# Patient Record
Sex: MALE | Race: BLACK OR AFRICAN AMERICAN | NOT HISPANIC OR LATINO | Employment: OTHER | URBAN - METROPOLITAN AREA
[De-identification: names, ages, dates, MRNs, and addresses within clinical notes are randomized per-mention and may not be internally consistent; named-entity substitution may affect disease eponyms.]

---

## 2024-10-01 ENCOUNTER — LAB VISIT (OUTPATIENT)
Dept: LAB | Facility: CLINIC | Age: 69
End: 2024-10-01
Payer: COMMERCIAL

## 2024-10-01 ENCOUNTER — OFFICE VISIT (OUTPATIENT)
Dept: FAMILY MEDICINE | Facility: CLINIC | Age: 69
End: 2024-10-01
Payer: COMMERCIAL

## 2024-10-01 VITALS
HEART RATE: 54 BPM | DIASTOLIC BLOOD PRESSURE: 78 MMHG | WEIGHT: 241.31 LBS | OXYGEN SATURATION: 99 % | SYSTOLIC BLOOD PRESSURE: 136 MMHG | BODY MASS INDEX: 32.69 KG/M2 | HEIGHT: 72 IN

## 2024-10-01 DIAGNOSIS — M10.00 IDIOPATHIC GOUT, UNSPECIFIED CHRONICITY, UNSPECIFIED SITE: ICD-10-CM

## 2024-10-01 DIAGNOSIS — Z23 NEED FOR PROPHYLACTIC VACCINATION AGAINST STREPTOCOCCUS PNEUMONIAE (PNEUMOCOCCUS) AND INFLUENZA: ICD-10-CM

## 2024-10-01 DIAGNOSIS — Z29.9 PREVENTIVE MEASURE: ICD-10-CM

## 2024-10-01 DIAGNOSIS — I10 ESSENTIAL HYPERTENSION, BENIGN: Primary | ICD-10-CM

## 2024-10-01 DIAGNOSIS — I10 ESSENTIAL HYPERTENSION, BENIGN: ICD-10-CM

## 2024-10-01 DIAGNOSIS — Z12.5 SPECIAL SCREENING FOR MALIGNANT NEOPLASM OF PROSTATE: ICD-10-CM

## 2024-10-01 DIAGNOSIS — Z13.220 ENCOUNTER FOR SCREENING FOR LIPID DISORDER: ICD-10-CM

## 2024-10-01 DIAGNOSIS — Z11.3 SCREEN FOR STD (SEXUALLY TRANSMITTED DISEASE): ICD-10-CM

## 2024-10-01 DIAGNOSIS — R73.9 ELEVATED BLOOD SUGAR: ICD-10-CM

## 2024-10-01 DIAGNOSIS — Z11.59 ENCOUNTER FOR HEPATITIS C SCREENING TEST FOR LOW RISK PATIENT: ICD-10-CM

## 2024-10-01 LAB
ALBUMIN SERPL BCP-MCNC: 3.9 G/DL (ref 3.5–5.2)
ALBUMIN/CREAT UR: 5.5 UG/MG (ref 0–30)
ALP SERPL-CCNC: 98 U/L (ref 55–135)
ALT SERPL W/O P-5'-P-CCNC: 35 U/L (ref 10–44)
ANION GAP SERPL CALC-SCNC: 10 MMOL/L (ref 8–16)
AST SERPL-CCNC: 30 U/L (ref 10–40)
BILIRUB SERPL-MCNC: 0.4 MG/DL (ref 0.1–1)
BUN SERPL-MCNC: 14 MG/DL (ref 8–23)
CALCIUM SERPL-MCNC: 9.5 MG/DL (ref 8.7–10.5)
CHLORIDE SERPL-SCNC: 106 MMOL/L (ref 95–110)
CHOLEST SERPL-MCNC: 226 MG/DL (ref 120–199)
CHOLEST/HDLC SERPL: 5.5 {RATIO} (ref 2–5)
CO2 SERPL-SCNC: 23 MMOL/L (ref 23–29)
COMPLEXED PSA SERPL-MCNC: 2.8 NG/ML (ref 0–4)
CREAT SERPL-MCNC: 1.6 MG/DL (ref 0.5–1.4)
CREAT UR-MCNC: 183 MG/DL (ref 23–375)
EST. GFR  (NO RACE VARIABLE): 46.4 ML/MIN/1.73 M^2
ESTIMATED AVG GLUCOSE: 126 MG/DL (ref 68–131)
GLUCOSE SERPL-MCNC: 108 MG/DL (ref 70–110)
HBA1C MFR BLD: 6 % (ref 4–5.6)
HCV AB SERPL QL IA: NORMAL
HDLC SERPL-MCNC: 41 MG/DL (ref 40–75)
HDLC SERPL: 18.1 % (ref 20–50)
HIV 1+2 AB+HIV1 P24 AG SERPL QL IA: NORMAL
LDLC SERPL CALC-MCNC: 160.8 MG/DL (ref 63–159)
MICROALBUMIN UR DL<=1MG/L-MCNC: 10 UG/ML
NONHDLC SERPL-MCNC: 185 MG/DL
POTASSIUM SERPL-SCNC: 4.7 MMOL/L (ref 3.5–5.1)
PROT SERPL-MCNC: 7.5 G/DL (ref 6–8.4)
SODIUM SERPL-SCNC: 139 MMOL/L (ref 136–145)
TRIGL SERPL-MCNC: 121 MG/DL (ref 30–150)
URATE SERPL-MCNC: 8.6 MG/DL (ref 3.4–7)

## 2024-10-01 PROCEDURE — 1160F RVW MEDS BY RX/DR IN RCRD: CPT | Mod: S$GLB,,, | Performed by: INTERNAL MEDICINE

## 2024-10-01 PROCEDURE — 99387 INIT PM E/M NEW PAT 65+ YRS: CPT | Mod: 25,S$GLB,, | Performed by: INTERNAL MEDICINE

## 2024-10-01 PROCEDURE — 80053 COMPREHEN METABOLIC PANEL: CPT | Performed by: INTERNAL MEDICINE

## 2024-10-01 PROCEDURE — 86803 HEPATITIS C AB TEST: CPT | Performed by: INTERNAL MEDICINE

## 2024-10-01 PROCEDURE — 3288F FALL RISK ASSESSMENT DOCD: CPT | Mod: S$GLB,,, | Performed by: INTERNAL MEDICINE

## 2024-10-01 PROCEDURE — 90471 IMMUNIZATION ADMIN: CPT | Mod: S$GLB,,, | Performed by: INTERNAL MEDICINE

## 2024-10-01 PROCEDURE — 3075F SYST BP GE 130 - 139MM HG: CPT | Mod: S$GLB,,, | Performed by: INTERNAL MEDICINE

## 2024-10-01 PROCEDURE — 83036 HEMOGLOBIN GLYCOSYLATED A1C: CPT | Performed by: INTERNAL MEDICINE

## 2024-10-01 PROCEDURE — 36415 COLL VENOUS BLD VENIPUNCTURE: CPT | Mod: ,,, | Performed by: INTERNAL MEDICINE

## 2024-10-01 PROCEDURE — 1101F PT FALLS ASSESS-DOCD LE1/YR: CPT | Mod: S$GLB,,, | Performed by: INTERNAL MEDICINE

## 2024-10-01 PROCEDURE — 3078F DIAST BP <80 MM HG: CPT | Mod: S$GLB,,, | Performed by: INTERNAL MEDICINE

## 2024-10-01 PROCEDURE — 82570 ASSAY OF URINE CREATININE: CPT | Performed by: INTERNAL MEDICINE

## 2024-10-01 PROCEDURE — 87389 HIV-1 AG W/HIV-1&-2 AB AG IA: CPT | Performed by: INTERNAL MEDICINE

## 2024-10-01 PROCEDURE — 80061 LIPID PANEL: CPT | Performed by: INTERNAL MEDICINE

## 2024-10-01 PROCEDURE — 84153 ASSAY OF PSA TOTAL: CPT | Performed by: INTERNAL MEDICINE

## 2024-10-01 PROCEDURE — 1159F MED LIST DOCD IN RCRD: CPT | Mod: S$GLB,,, | Performed by: INTERNAL MEDICINE

## 2024-10-01 PROCEDURE — 1126F AMNT PAIN NOTED NONE PRSNT: CPT | Mod: S$GLB,,, | Performed by: INTERNAL MEDICINE

## 2024-10-01 PROCEDURE — 3008F BODY MASS INDEX DOCD: CPT | Mod: S$GLB,,, | Performed by: INTERNAL MEDICINE

## 2024-10-01 PROCEDURE — 84550 ASSAY OF BLOOD/URIC ACID: CPT | Performed by: INTERNAL MEDICINE

## 2024-10-01 PROCEDURE — 90677 PCV20 VACCINE IM: CPT | Mod: S$GLB,,, | Performed by: INTERNAL MEDICINE

## 2024-10-01 RX ORDER — DICLOFENAC SODIUM 50 MG/1
50 TABLET, DELAYED RELEASE ORAL
COMMUNITY
Start: 2024-09-15

## 2024-10-01 RX ORDER — COLCHICINE 0.6 MG/1
0.6 TABLET ORAL
COMMUNITY
Start: 2024-09-15

## 2024-10-01 RX ORDER — ACETAMINOPHEN 500 MG
TABLET ORAL DAILY
COMMUNITY

## 2024-10-01 RX ORDER — CHOLECALCIFEROL (VITAMIN D3) 25 MCG
1000 TABLET ORAL DAILY
COMMUNITY
End: 2024-10-01 | Stop reason: DRUGHIGH

## 2024-10-01 RX ORDER — COLCHICINE 0.6 MG/1
0.6 TABLET ORAL 2 TIMES DAILY
Qty: 60 TABLET | Refills: 0 | Status: SHIPPED | OUTPATIENT
Start: 2024-10-01 | End: 2024-10-31

## 2024-10-01 RX ORDER — NIFEDIPINE 60 MG/1
30 TABLET, EXTENDED RELEASE ORAL DAILY
COMMUNITY

## 2024-10-01 NOTE — ASSESSMENT & PLAN NOTE
I am going to screen him for lipids blood sugar thyroid disease and check his renal function  To consider switching his calcium channel blocker to an Ace or an Arb

## 2024-10-01 NOTE — PROGRESS NOTES
Subjective:       Patient ID: Ghulam Rodriguez is a 69 y.o. male.    Chief Complaint: Establish Care (Patient is here to establish care. )      Mr Rodriguez is a new patient who presents today to establish care and for management of the chronic problems , refills and preventive medicine      Hypertension  This is a chronic problem. The current episode started more than 1 year ago. The problem has been waxing and waning since onset. The problem is controlled. There are no associated agents to hypertension. Risk factors for coronary artery disease include obesity, stress, sedentary lifestyle, family history and dyslipidemia. Past treatments include calcium channel blockers. The current treatment provides moderate improvement. Compliance problems include diet and exercise.      Review of Systems   Constitutional:  Negative for activity change, appetite change and fever.   Respiratory: Negative.     Cardiovascular: Negative.    Gastrointestinal: Negative.    Musculoskeletal: Negative.  Positive for leg pain.         Objective:      Physical Exam  Vitals and nursing note reviewed.   Constitutional:       Appearance: Normal appearance. He is obese.   Cardiovascular:      Rate and Rhythm: Normal rate and regular rhythm.      Pulses: Normal pulses.      Heart sounds: Normal heart sounds. No murmur heard.     No friction rub. No gallop.   Pulmonary:      Effort: Pulmonary effort is normal.      Breath sounds: Normal breath sounds. No wheezing.   Skin:     General: Skin is warm and dry.   Neurological:      Mental Status: He is alert.   Psychiatric:         Mood and Affect: Mood normal.         Assessment:       1. Essential hypertension, benign  Overview:  His blood pressure is controlled today  He was started on nifedipine    Assessment & Plan:  I am going to screen him for lipids blood sugar thyroid disease and check his renal function  To consider switching his calcium channel blocker to an Ace or an Arb    Orders:  -      Comprehensive Metabolic Panel; Future; Expected date: 10/01/2024  -     Microalbumin/Creatinine Ratio, Urine; Future; Expected date: 10/01/2024    2. Elevated blood sugar  -     Hemoglobin A1C; Future; Expected date: 10/01/2024    3. Special screening for malignant neoplasm of prostate  -     Prostate Specific Antigen, Diagnostic; Future; Expected date: 10/01/2024    4. Encounter for screening for lipid disorder  -     Lipid Panel; Future; Expected date: 10/01/2024    5. Screen for STD (sexually transmitted disease)  -     HIV 1/2 Ag/Ab (4th Gen); Future; Expected date: 04/01/2025    6. Encounter for hepatitis C screening test for low risk patient  -     Cancel: Hepatitis C Antibody; Future; Expected date: 10/01/2024  -     Hepatitis C Antibody; Future; Expected date: 10/01/2024    7. Idiopathic gout, unspecified chronicity, unspecified site  Overview:  With occasional pain in his right great toe    Assessment & Plan:  I am going to refill his colchicine  We discussed diet modification  Check his uric acid  To consider allopurinol    Orders:  -     Uric Acid; Future; Expected date: 10/01/2024    8. Need for prophylactic vaccination against Streptococcus pneumoniae (pneumococcus) and influenza  -     pneumoc 20-augie conj-dip cr(PF) (PREVNAR-20 (PF)) injection Syrg 0.5 mL    9. Preventive measure  Overview:  Patient presents for a wellness visit  No new c/o today  Please refer to initial intake notes for screening/preventive measures  All histories and immunization schedule reviewed with patient      Assessment & Plan:  Prevnar 20 today  He declined flu  Get new COVID vaccine  Get PSA  Screen for hep C  Get lipids, A1c  Diet , wt loss , exercise d/w patient        Other orders  -     colchicine (COLCRYS) 0.6 mg tablet; Take 1 tablet (0.6 mg total) by mouth 2 (two) times daily.  Dispense: 60 tablet; Refill: 0           Plan:       1. Essential hypertension, benign  Overview:  His blood pressure is controlled today  He  was started on nifedipine    Assessment & Plan:  I am going to screen him for lipids blood sugar thyroid disease and check his renal function  To consider switching his calcium channel blocker to an Ace or an Arb    Orders:  -     Comprehensive Metabolic Panel; Future; Expected date: 10/01/2024  -     Microalbumin/Creatinine Ratio, Urine; Future; Expected date: 10/01/2024    2. Elevated blood sugar  -     Hemoglobin A1C; Future; Expected date: 10/01/2024    3. Special screening for malignant neoplasm of prostate  -     Prostate Specific Antigen, Diagnostic; Future; Expected date: 10/01/2024    4. Encounter for screening for lipid disorder  -     Lipid Panel; Future; Expected date: 10/01/2024    5. Screen for STD (sexually transmitted disease)  -     HIV 1/2 Ag/Ab (4th Gen); Future; Expected date: 04/01/2025    6. Encounter for hepatitis C screening test for low risk patient  -     Cancel: Hepatitis C Antibody; Future; Expected date: 10/01/2024  -     Hepatitis C Antibody; Future; Expected date: 10/01/2024    7. Idiopathic gout, unspecified chronicity, unspecified site  Overview:  With occasional pain in his right great toe    Assessment & Plan:  I am going to refill his colchicine  We discussed diet modification  Check his uric acid  To consider allopurinol    Orders:  -     Uric Acid; Future; Expected date: 10/01/2024    8. Need for prophylactic vaccination against Streptococcus pneumoniae (pneumococcus) and influenza  -     pneumoc 20-augie conj-dip cr(PF) (PREVNAR-20 (PF)) injection Syrg 0.5 mL    9. Preventive measure  Overview:  Patient presents for a wellness visit  No new c/o today  Please refer to initial intake notes for screening/preventive measures  All histories and immunization schedule reviewed with patient      Assessment & Plan:  Prevnar 20 today  He declined flu  Get new COVID vaccine  Get PSA  Screen for hep C  Get lipids, A1c  Diet , wt loss , exercise d/w patient        Other orders  -      colchicine (COLCRYS) 0.6 mg tablet; Take 1 tablet (0.6 mg total) by mouth 2 (two) times daily.  Dispense: 60 tablet; Refill: 0

## 2024-10-01 NOTE — PROGRESS NOTES
Patient presents for Prevnar 20 vaccination.  Prevnar 20 vaccine given IM per provider order.  See MAR for further details.  Patient tolerated well.  No signs or symptoms of adverse reaction after 15 minutes.

## 2024-10-01 NOTE — ASSESSMENT & PLAN NOTE
Prevnar 20 today  He declined flu  Get new COVID vaccine  Get PSA  Screen for hep C  Get lipids, A1c  Diet , wt loss , exercise d/w patient

## 2024-10-01 NOTE — ASSESSMENT & PLAN NOTE
I am going to refill his colchicine  We discussed diet modification  Check his uric acid  To consider allopurinol

## 2024-10-17 ENCOUNTER — OFFICE VISIT (OUTPATIENT)
Dept: FAMILY MEDICINE | Facility: CLINIC | Age: 69
End: 2024-10-17
Payer: COMMERCIAL

## 2024-10-17 VITALS
SYSTOLIC BLOOD PRESSURE: 122 MMHG | OXYGEN SATURATION: 99 % | DIASTOLIC BLOOD PRESSURE: 68 MMHG | WEIGHT: 240 LBS | BODY MASS INDEX: 32.51 KG/M2 | HEIGHT: 72 IN

## 2024-10-17 DIAGNOSIS — N18.31 TYPE 2 DIABETES MELLITUS WITH STAGE 3A CHRONIC KIDNEY DISEASE, WITHOUT LONG-TERM CURRENT USE OF INSULIN: Primary | ICD-10-CM

## 2024-10-17 DIAGNOSIS — R73.9 ELEVATED BLOOD SUGAR: ICD-10-CM

## 2024-10-17 DIAGNOSIS — Z23 NEED FOR PROPHYLACTIC VACCINATION AGAINST STREPTOCOCCUS PNEUMONIAE (PNEUMOCOCCUS) AND INFLUENZA: ICD-10-CM

## 2024-10-17 DIAGNOSIS — N40.0 BENIGN PROSTATIC HYPERPLASIA WITHOUT LOWER URINARY TRACT SYMPTOMS: ICD-10-CM

## 2024-10-17 DIAGNOSIS — E11.22 TYPE 2 DIABETES MELLITUS WITH STAGE 3A CHRONIC KIDNEY DISEASE, WITHOUT LONG-TERM CURRENT USE OF INSULIN: Primary | ICD-10-CM

## 2024-10-17 DIAGNOSIS — I10 ESSENTIAL HYPERTENSION, BENIGN: ICD-10-CM

## 2024-10-17 DIAGNOSIS — E66.9 OBESITY (BMI 30-39.9): ICD-10-CM

## 2024-10-17 DIAGNOSIS — E78.2 MIXED HYPERLIPIDEMIA: ICD-10-CM

## 2024-10-17 RX ORDER — LISINOPRIL 10 MG/1
10 TABLET ORAL DAILY
COMMUNITY

## 2024-10-17 RX ORDER — ROSUVASTATIN CALCIUM 10 MG/1
10 TABLET, COATED ORAL NIGHTLY
Qty: 90 TABLET | Refills: 1 | Status: SHIPPED | OUTPATIENT
Start: 2024-10-17 | End: 2025-04-15

## 2024-10-17 RX ORDER — SEMAGLUTIDE 0.25 MG/.5ML
0.5 INJECTION, SOLUTION SUBCUTANEOUS WEEKLY
Qty: 4 ML | Refills: 0 | Status: SHIPPED | OUTPATIENT
Start: 2024-10-17 | End: 2024-11-14

## 2024-10-17 NOTE — PROGRESS NOTES
Patient presents for influenza vaccination.  Flu vaccine given IM per provider order.  See MAR for further details.  Patient tolerated well.  No signs or symptoms of adverse reaction after 15 minutes.

## 2024-10-17 NOTE — PATIENT INSTRUCTIONS
Co Q-10 100 or 200mg daily    Please get the following vaccines from local pharmacy:    Flu  RSV  COVID

## 2024-10-17 NOTE — PROGRESS NOTES
"Subjective:       Patient ID: Ghulam Rodriguez is a 69 y.o. male.    Chief Complaint: Results (Patient is here for results of his blood work. )      History of Present Illness    CHIEF COMPLAINT:  Ghulam presents for a follow-up visit to discuss recent lab results and complains of a locking right index finger.    HPI:  Ghulam reports intermittent locking of his right index finger, without providing specific details about the onset, duration, or severity of this symptom. He has been taking colchicine twice daily for gout management and using Voltaren (diclofenac) for pain relief.    Recent lab work revealed borderline high blood sugar, though he has not been diagnosed with diabetes. His cholesterol levels were also high, particularly his LDL at 160. The lab results indicated reduced kidney function, described as "stage 3A" chronic kidney disease.    Ghulam denies any other problems or concerns besides the finger issue and has not been diagnosed with diabetes.    MEDICATIONS:  Ghulam is on Lisinopril and Nifedipine for blood pressure management. He is also taking Colchicine twice daily for gout and Diclofenac (Voltaren) as needed for pain and inflammation.    MEDICAL HISTORY:  Ghulam has a history of gout, chronic kidney disease (Stage 3A), hypercholesterolemia, hypertension, and prediabetes.    TEST RESULTS:  Ghulam's recent blood sugar were borderline elevated. His cholesterol levels were also recently elevated, with an LDL of 160. Kidney function tests showed a reduction to stage 3A. Ghulam's A1C is scheduled to be repeated next year.         Review of Systems   Constitutional:  Negative for activity change, appetite change and fever.   Respiratory: Negative.     Cardiovascular: Negative.    Gastrointestinal: Negative.    Musculoskeletal: Negative.          Objective:      Physical Exam  Vitals and nursing note reviewed.   Constitutional:       Appearance: Normal appearance. He is obese.   Cardiovascular:      Rate " and Rhythm: Normal rate and regular rhythm.      Pulses: Normal pulses.      Heart sounds: Normal heart sounds. No murmur heard.     No friction rub. No gallop.   Pulmonary:      Effort: Pulmonary effort is normal.      Breath sounds: Normal breath sounds. No wheezing.   Abdominal:      General: Abdomen is flat. Bowel sounds are normal.      Palpations: Abdomen is soft.   Musculoskeletal:         General: Normal range of motion.      Comments: Mild triggering of R index finger   Skin:     General: Skin is warm and dry.   Neurological:      General: No focal deficit present.      Mental Status: He is alert and oriented to person, place, and time.   Psychiatric:         Mood and Affect: Mood normal.         Assessment:       1. Type 2 diabetes mellitus with stage 3a chronic kidney disease, without long-term current use of insulin  Assessment & Plan:  Inc BS  No DM diagnosis yet  Monitor BS, A1c    Orders:  -     rosuvastatin (CRESTOR) 10 MG tablet; Take 1 tablet (10 mg total) by mouth every evening.  Dispense: 90 tablet; Refill: 1  -     semaglutide, weight loss, (WEGOVY) 0.25 mg/0.5 mL PnIj; Inject 0.5 mg into the skin once a week.  Dispense: 4 mL; Refill: 0    2. Mixed hyperlipidemia  -     rosuvastatin (CRESTOR) 10 MG tablet; Take 1 tablet (10 mg total) by mouth every evening.  Dispense: 90 tablet; Refill: 1  -     Lipid Panel; Future; Expected date: 10/17/2024    3. Essential hypertension, benign  Overview:  His blood pressure is controlled today  He was started on nifedipine    Orders:  -     Microalbumin/Creatinine Ratio, Urine; Future; Expected date: 10/17/2024  -     Comprehensive Metabolic Panel; Future; Expected date: 10/17/2024    4. Elevated blood sugar  -     Hemoglobin A1C; Future; Expected date: 10/17/2024    5. Benign prostatic hyperplasia without lower urinary tract symptoms  -     Prostate Specific Antigen, Diagnostic; Future; Expected date: 10/17/2024    6. Need for prophylactic vaccination against  Streptococcus pneumoniae (pneumococcus) and influenza  -     influenza (adjuvanted) (Fluad) 45 mcg/0.5 mL IM vaccine (> or = 66 yo) 0.5 mL    7. Obesity (BMI 30-39.9)  -     semaglutide, weight loss, (WEGOVY) 0.25 mg/0.5 mL PnIj; Inject 0.5 mg into the skin once a week.  Dispense: 4 mL; Refill: 0           Plan:       Assessment & Plan    Assessed lab results: borderline elevated blood sugar, elevated LDL cholesterol (160), and stage 3A chronic kidney disease  Evaluated patient's 10-year risk of heart attack using risk calculator, determining elevated risk with and without diabetes diagnosis  Diagnosed right trigger finger based on patient's symptoms and exam  Considered weight loss medication (Wegovy) for patient, pending insurance coverage    CHRONIC KIDNEY DISEASE:  - Discussed chronic kidney disease.  - Discontinued colchicine for gout due to potential kidney damage.  - Decreased diclofenac (Voltaren) use due to potential kidney damage.  - Recommend using Tylenol instead of anti-inflammatory medications for pain management.    HYPERLIPIDEMIA:  - Explained importance of a low-fat diet for managing cholesterol and blood sugar.  - Reviewed benefits of CoQ10 supplement for heart health and potential muscle aches from cholesterol medication.  - Hgulam to follow a low-fat diet.  - Started Rosuvastatin (Crestor) for cholesterol management, to be taken nightly at bedtime.  - Prescribed 90 pills with 1 refill.  - Started CoQ10 100mg or 200mg daily (OTC).    DIABETES MANAGEMENT:  - Recommend reducing carbohydrate intake.  - Ghulam to avoid sugary drinks and soy.  - Recommend reducing bread consumption.  - Ghulam to choose white meat over red meat.    HYPERTENSION:  - Continued lisinopril and nifedipine for blood pressure management.    WEIGHT MANAGEMENT:  - Started Wegovy 0.5mg weekly for weight loss, pending insurance coverage.  - Recommend increasing physical activity, particularly walking.    HYDRATION:  - Ghulam to  increase water intake.    FLU VACCINATION:  - Administered flu vaccine during visit.    LABS:  - Ordered labs for January, including kidney function tests, cholesterol panel, and HbA1c.  - Complete labs 2 days before next appointment.    FOLLOW UP:  - Follow up in 3 months (January).       Ghulam was seen today for results.    Diagnoses and all orders for this visit:    Type 2 diabetes mellitus with stage 3a chronic kidney disease, without long-term current use of insulin  -     rosuvastatin (CRESTOR) 10 MG tablet; Take 1 tablet (10 mg total) by mouth every evening.  -     semaglutide, weight loss, (WEGOVY) 0.25 mg/0.5 mL PnIj; Inject 0.5 mg into the skin once a week.    Mixed hyperlipidemia  -     rosuvastatin (CRESTOR) 10 MG tablet; Take 1 tablet (10 mg total) by mouth every evening.  -     Lipid Panel; Future    Essential hypertension, benign  -     Microalbumin/Creatinine Ratio, Urine; Future  -     Comprehensive Metabolic Panel; Future    Elevated blood sugar  -     Hemoglobin A1C; Future    Benign prostatic hyperplasia without lower urinary tract symptoms  -     Prostate Specific Antigen, Diagnostic; Future    Need for prophylactic vaccination against Streptococcus pneumoniae (pneumococcus) and influenza  -     influenza (adjuvanted) (Fluad) 45 mcg/0.5 mL IM vaccine (> or = 64 yo) 0.5 mL    Obesity (BMI 30-39.9)  -     semaglutide, weight loss, (WEGOVY) 0.25 mg/0.5 mL PnIj; Inject 0.5 mg into the skin once a week.            Visit today included increased complexity associated with the care of the episodic problem.   I addressed and managing the longitudinal care of the patient due to the serious and/or complex managed problem(s).  .

## 2025-01-13 ENCOUNTER — LAB VISIT (OUTPATIENT)
Dept: LAB | Facility: CLINIC | Age: 70
End: 2025-01-13
Payer: COMMERCIAL

## 2025-01-13 DIAGNOSIS — E78.2 MIXED HYPERLIPIDEMIA: ICD-10-CM

## 2025-01-13 DIAGNOSIS — I10 ESSENTIAL HYPERTENSION, BENIGN: ICD-10-CM

## 2025-01-13 DIAGNOSIS — R73.9 ELEVATED BLOOD SUGAR: ICD-10-CM

## 2025-01-13 LAB
ALBUMIN SERPL BCP-MCNC: 4 G/DL (ref 3.5–5.2)
ALBUMIN/CREAT UR: 6.3 UG/MG (ref 0–30)
ALP SERPL-CCNC: 89 U/L (ref 40–150)
ALT SERPL W/O P-5'-P-CCNC: 31 U/L (ref 10–44)
ANION GAP SERPL CALC-SCNC: 9 MMOL/L (ref 8–16)
AST SERPL-CCNC: 27 U/L (ref 10–40)
BILIRUB SERPL-MCNC: 0.4 MG/DL (ref 0.1–1)
BUN SERPL-MCNC: 14 MG/DL (ref 8–23)
CALCIUM SERPL-MCNC: 9.7 MG/DL (ref 8.7–10.5)
CHLORIDE SERPL-SCNC: 107 MMOL/L (ref 95–110)
CHOLEST SERPL-MCNC: 155 MG/DL (ref 120–199)
CHOLEST/HDLC SERPL: 3.9 {RATIO} (ref 2–5)
CO2 SERPL-SCNC: 26 MMOL/L (ref 23–29)
CREAT SERPL-MCNC: 1.6 MG/DL (ref 0.5–1.4)
CREAT UR-MCNC: 142 MG/DL (ref 23–375)
EST. GFR  (NO RACE VARIABLE): 46.4 ML/MIN/1.73 M^2
ESTIMATED AVG GLUCOSE: 128 MG/DL (ref 68–131)
GLUCOSE SERPL-MCNC: 108 MG/DL (ref 70–110)
HBA1C MFR BLD: 6.1 % (ref 4–5.6)
HDLC SERPL-MCNC: 40 MG/DL (ref 40–75)
HDLC SERPL: 25.8 % (ref 20–50)
LDLC SERPL CALC-MCNC: 99.6 MG/DL (ref 63–159)
MICROALBUMIN UR DL<=1MG/L-MCNC: 9 UG/ML
NONHDLC SERPL-MCNC: 115 MG/DL
POTASSIUM SERPL-SCNC: 4.8 MMOL/L (ref 3.5–5.1)
PROT SERPL-MCNC: 7.4 G/DL (ref 6–8.4)
SODIUM SERPL-SCNC: 142 MMOL/L (ref 136–145)
TRIGL SERPL-MCNC: 77 MG/DL (ref 30–150)

## 2025-01-13 PROCEDURE — 83036 HEMOGLOBIN GLYCOSYLATED A1C: CPT | Performed by: INTERNAL MEDICINE

## 2025-01-13 PROCEDURE — 82043 UR ALBUMIN QUANTITATIVE: CPT | Performed by: INTERNAL MEDICINE

## 2025-01-13 PROCEDURE — 80061 LIPID PANEL: CPT | Performed by: INTERNAL MEDICINE

## 2025-01-13 PROCEDURE — 80053 COMPREHEN METABOLIC PANEL: CPT | Performed by: INTERNAL MEDICINE

## 2025-01-13 PROCEDURE — 36415 COLL VENOUS BLD VENIPUNCTURE: CPT | Mod: ,,, | Performed by: INTERNAL MEDICINE

## 2025-01-17 ENCOUNTER — OFFICE VISIT (OUTPATIENT)
Dept: FAMILY MEDICINE | Facility: CLINIC | Age: 70
End: 2025-01-17
Payer: COMMERCIAL

## 2025-01-17 VITALS
BODY MASS INDEX: 31.83 KG/M2 | HEART RATE: 62 BPM | DIASTOLIC BLOOD PRESSURE: 79 MMHG | HEIGHT: 72 IN | WEIGHT: 235 LBS | OXYGEN SATURATION: 98 % | SYSTOLIC BLOOD PRESSURE: 135 MMHG

## 2025-01-17 DIAGNOSIS — M1A.1790 LEAD-INDUCED CHRONIC GOUT OF FOOT WITHOUT TOPHUS, UNSPECIFIED LATERALITY, SUBSEQUENT ENCOUNTER: ICD-10-CM

## 2025-01-17 DIAGNOSIS — I10 ESSENTIAL HYPERTENSION, BENIGN: ICD-10-CM

## 2025-01-17 DIAGNOSIS — T56.0X1D LEAD-INDUCED CHRONIC GOUT OF FOOT WITHOUT TOPHUS, UNSPECIFIED LATERALITY, SUBSEQUENT ENCOUNTER: ICD-10-CM

## 2025-01-17 DIAGNOSIS — Z00.00 WELLNESS EXAMINATION: ICD-10-CM

## 2025-01-17 DIAGNOSIS — N18.31 CHRONIC KIDNEY DISEASE (CKD) STAGE G3A/A1, MODERATELY DECREASED GLOMERULAR FILTRATION RATE (GFR) BETWEEN 45-59 ML/MIN/1.73 SQUARE METER AND ALBUMINURIA CREATININE RATIO LESS THAN 30 MG/G: Primary | ICD-10-CM

## 2025-01-17 DIAGNOSIS — M1A.0720 IDIOPATHIC CHRONIC GOUT OF LEFT FOOT WITHOUT TOPHUS: ICD-10-CM

## 2025-01-17 PROBLEM — E11.22 TYPE 2 DIABETES MELLITUS WITH STAGE 3A CHRONIC KIDNEY DISEASE, WITHOUT LONG-TERM CURRENT USE OF INSULIN: Status: RESOLVED | Noted: 2024-10-17 | Resolved: 2025-01-17

## 2025-01-17 PROBLEM — M1A.0790 CHRONIC GOUT OF FOOT: Status: ACTIVE | Noted: 2025-01-17

## 2025-01-17 PROCEDURE — 1101F PT FALLS ASSESS-DOCD LE1/YR: CPT | Mod: S$GLB,,, | Performed by: INTERNAL MEDICINE

## 2025-01-17 PROCEDURE — 99397 PER PM REEVAL EST PAT 65+ YR: CPT | Mod: S$GLB,,, | Performed by: INTERNAL MEDICINE

## 2025-01-17 PROCEDURE — 1160F RVW MEDS BY RX/DR IN RCRD: CPT | Mod: S$GLB,,, | Performed by: INTERNAL MEDICINE

## 2025-01-17 PROCEDURE — 3061F NEG MICROALBUMINURIA REV: CPT | Mod: S$GLB,,, | Performed by: INTERNAL MEDICINE

## 2025-01-17 PROCEDURE — 3288F FALL RISK ASSESSMENT DOCD: CPT | Mod: S$GLB,,, | Performed by: INTERNAL MEDICINE

## 2025-01-17 PROCEDURE — 3044F HG A1C LEVEL LT 7.0%: CPT | Mod: S$GLB,,, | Performed by: INTERNAL MEDICINE

## 2025-01-17 PROCEDURE — 99213 OFFICE O/P EST LOW 20 MIN: CPT | Mod: 25,S$GLB,, | Performed by: INTERNAL MEDICINE

## 2025-01-17 PROCEDURE — 1159F MED LIST DOCD IN RCRD: CPT | Mod: S$GLB,,, | Performed by: INTERNAL MEDICINE

## 2025-01-17 PROCEDURE — 3075F SYST BP GE 130 - 139MM HG: CPT | Mod: S$GLB,,, | Performed by: INTERNAL MEDICINE

## 2025-01-17 PROCEDURE — 1126F AMNT PAIN NOTED NONE PRSNT: CPT | Mod: S$GLB,,, | Performed by: INTERNAL MEDICINE

## 2025-01-17 PROCEDURE — 3078F DIAST BP <80 MM HG: CPT | Mod: S$GLB,,, | Performed by: INTERNAL MEDICINE

## 2025-01-17 PROCEDURE — 4010F ACE/ARB THERAPY RXD/TAKEN: CPT | Mod: S$GLB,,, | Performed by: INTERNAL MEDICINE

## 2025-01-17 PROCEDURE — 3066F NEPHROPATHY DOC TX: CPT | Mod: S$GLB,,, | Performed by: INTERNAL MEDICINE

## 2025-01-17 PROCEDURE — 3008F BODY MASS INDEX DOCD: CPT | Mod: S$GLB,,, | Performed by: INTERNAL MEDICINE

## 2025-01-17 RX ORDER — ALLOPURINOL 100 MG/1
100 TABLET ORAL DAILY
Qty: 90 TABLET | Refills: 1 | Status: SHIPPED | OUTPATIENT
Start: 2025-01-17 | End: 2025-07-16

## 2025-01-17 RX ORDER — LISINOPRIL 20 MG/1
20 TABLET ORAL DAILY
COMMUNITY
End: 2025-01-17 | Stop reason: SDUPTHER

## 2025-01-17 RX ORDER — COLCHICINE 0.6 MG/1
0.6 TABLET ORAL DAILY
Qty: 30 TABLET | Refills: 2 | Status: SHIPPED | OUTPATIENT
Start: 2025-01-17 | End: 2025-04-17

## 2025-01-17 RX ORDER — ALLOPURINOL 100 MG/1
100 TABLET ORAL DAILY
COMMUNITY
End: 2025-01-17 | Stop reason: SDUPTHER

## 2025-01-17 RX ORDER — LISINOPRIL 20 MG/1
20 TABLET ORAL DAILY
Qty: 90 TABLET | Refills: 1 | Status: SHIPPED | OUTPATIENT
Start: 2025-01-17 | End: 2025-07-16

## 2025-01-18 NOTE — PROGRESS NOTES
Subjective:       Patient ID: Ghulam Rodriguez is a 69 y.o. male.    Chief Complaint: Health Maintenance (Patient is here for health [facility].) and Annual Exam      History of Present Illness    CHIEF COMPLAINT:  Ghulam presents for a follow-up visit to discuss recent lab results, particularly focusing on kidney function and gout management.    HPI:  Ghulam underwent labs on Monday to assess kidney function and other health parameters. He reports persistent gout symptoms affecting his left great toe. There has been confusion regarding medication usage for gout management, as he has been taking the as-needed medication twice daily instead of only during gout flares, which may have contributed to his kidney issues. He reports attempting dietary modifications to improve his health, including reducing whyte consumption and expressing concern about pork intake. Ghulam is diagnosed with stage 3A renal insufficiency and denies any new or worsening symptoms.    MEDICATIONS:  Ghulam is on Lisinopril 20 mg daily for blood pressure management. He is also taking Allopurinol daily for gout prevention and Colchicine as needed for gout flares.    MEDICAL HISTORY:  Ghulam has a history of gout, stage 3A renal insufficiency, and hypertension.    TEST RESULTS:  A chemistry panel was conducted on Monday. The results showed stage 3A renal insufficiency, while glucose was within normal limits. Other results were within the normal range, except for the kidney-related values.    SOCIAL HISTORY:  Ghulam is retired but currently works as a . He is .         Review of Systems   Constitutional:  Negative for activity change, appetite change and fever.   Respiratory: Negative.     Cardiovascular: Negative.    Gastrointestinal: Negative.    Musculoskeletal: Negative.          Objective:      Physical Exam  Vitals and nursing note reviewed.   Constitutional:       Appearance: Normal appearance. He is obese.    Cardiovascular:      Rate and Rhythm: Normal rate and regular rhythm.      Pulses: Normal pulses.      Heart sounds: Normal heart sounds. No murmur heard.     No friction rub. No gallop.   Pulmonary:      Effort: Pulmonary effort is normal.      Breath sounds: Normal breath sounds. No wheezing.   Abdominal:      General: Abdomen is flat. Bowel sounds are normal.      Palpations: Abdomen is soft.   Musculoskeletal:         General: Normal range of motion.   Skin:     General: Skin is warm and dry.   Neurological:      General: No focal deficit present.      Mental Status: He is alert and oriented to person, place, and time.   Psychiatric:         Mood and Affect: Mood normal.         Assessment:       1. Chronic kidney disease (CKD) stage G3a/A1, moderately decreased glomerular filtration rate (GFR) between 45-59 mL/min/1.73 square meter and albuminuria creatinine ratio less than 30 mg/g  Overview:  Table, low GFR    Assessment & Plan:  I discussed renal condition with patient at length  We discussed diet modification specifically decreasing salt intake, avoiding fast food ,avoiding fried food  We also discussed the importance of minimizing the use of anti-inflammatory agents like Motrin ibuprofen naproxen Aleve etc.  We discussed the importance of increasing fluid intake specifically water and also stay hydrated  We discussed minimizing the use of diuretics as much as clinically possible  We are going to monitor renal functions BUN creatinine urine microalbumin and electrolytes closely  To consider referral to Nephrology Service if renal fx continue to decline  To consider adding an SGL- 2 inhibitor if renal functions continue to decline, regardless of the blood sugar status      Orders:  -     Basic Metabolic Panel; Future; Expected date: 01/17/2025    2. Idiopathic chronic gout of left foot without tophus  Overview:  With occasional pain in his right great toe    Orders:  -     Uric acid; Future; Expected date:  01/17/2025    3. Wellness examination  Overview:  Patient presents for a wellness visit  No new c/o today  Please refer to initial intake notes for screening/preventive measures  All histories and immunization schedule reviewed with patient      Assessment & Plan:  I gave the patient written recommendations re the outstanding vaccinations.  Diet , wt loss , exercise d/w patient          4. Essential hypertension, benign  Overview:  His blood pressure is controlled today  He was started on nifedipine    Assessment & Plan:  Make lisinopril 20mg  Diet , wt loss , exercise d/w patient        5. 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  Dispense: 30 tablet; Refill: 2  -     allopurinoL (ZYLOPRIM) 100 MG tablet; Take 1 tablet (100 mg total) by mouth once daily.  Dispense: 90 tablet; Refill: 1  -     lisinopriL (PRINIVIL,ZESTRIL) 20 MG tablet; Take 1 tablet (20 mg total) by mouth once daily.  Dispense: 90 tablet; Refill: 1           Plan:       Assessment & Plan    IMPRESSION:  - Assessed renal function, noting stage 3A renal insufficiency with no improvement but no worsening  - Evaluated gout management, identifying need for medication adjustment to improve kidney function  - Reviewed blood pressure control, deciding to increase lisinopril dosage    TYPE 2 DIABETES MELLITUS WITH DIABETIC CHRONIC KIDNEY DISEASE:  - Monitored blood sugar, which are currently good.  - Evaluated kidneys as stage 3A renal insufficiency, with no change.  - Discussed kidney health and will attach information to the visit summary.  - Advised patient on lifestyle modifications: drink plenty of fluids, reduce salt intake, exercise, lose weight, eat healthy, and avoid  excessive use of NSAIDs.    GOUT:  - Monitored ongoing issues with gout in the left great toe and evaluated flare-ups.  - Continued allopurinol daily for gout management, providing a 6-month prescription.  - Prescribed colchicine for gout flares, to be taken only during flares (maximum 2 tablets per day during severe flares).  - Advised against frequent use of colchicine due to potential kidney damage.  - Ordered labs to check gout levels in 3 months.    HYPERTENSION:  - Noted slightly elevated blood pressure and increased lisinopril from 10 mg to 20 mg daily for better control.  - Advised on transitioning to the new dosage.    HYPERLIPIDEMIA:  - Monitored lipid levels through recent labs, noting that most values are good.  - No specific treatment for hyperlipidemia was mentioned.    WEIGHT MANAGEMENT:  - Recommend losing weight through a healthy diet, including continued reduction of whyte and pork consumption.    OTHER INSTRUCTIONS:  - Reviewed the risks of driving on icy roads.       Ghulam was seen today for health maintenance and annual exam.    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  -     allopurinoL (ZYLOPRIM) 100 MG tablet; Take 1 tablet (100 mg total) by mouth once daily.  -     lisinopriL (PRINIVIL,ZESTRIL) 20 MG tablet; Take 1 tablet (20 mg total) by mouth once daily.

## 2025-01-18 NOTE — ASSESSMENT & PLAN NOTE
I gave the patient written recommendations re the outstanding vaccinations.  Diet , wt loss , exercise d/w patient       Received call from [facility] desk regarding pt's upcoming labs. Mom would like a rush on results because they need them by tomorrow. Please advise.

## 2025-04-14 ENCOUNTER — LAB VISIT (OUTPATIENT)
Dept: LAB | Facility: CLINIC | Age: 70
End: 2025-04-14
Payer: MEDICARE

## 2025-04-14 DIAGNOSIS — E11.22 TYPE 2 DIABETES MELLITUS WITH STAGE 3A CHRONIC KIDNEY DISEASE, WITHOUT LONG-TERM CURRENT USE OF INSULIN: ICD-10-CM

## 2025-04-14 DIAGNOSIS — N18.31 TYPE 2 DIABETES MELLITUS WITH STAGE 3A CHRONIC KIDNEY DISEASE, WITHOUT LONG-TERM CURRENT USE OF INSULIN: ICD-10-CM

## 2025-04-14 DIAGNOSIS — M1A.0720 IDIOPATHIC CHRONIC GOUT OF LEFT FOOT WITHOUT TOPHUS: ICD-10-CM

## 2025-04-14 DIAGNOSIS — E78.2 MIXED HYPERLIPIDEMIA: ICD-10-CM

## 2025-04-14 LAB — URATE SERPL-MCNC: 9.4 MG/DL (ref 3.4–7)

## 2025-04-14 PROCEDURE — 84550 ASSAY OF BLOOD/URIC ACID: CPT

## 2025-04-14 RX ORDER — ROSUVASTATIN CALCIUM 10 MG/1
10 TABLET, COATED ORAL NIGHTLY
Qty: 90 TABLET | Refills: 2 | Status: SHIPPED | OUTPATIENT
Start: 2025-04-14

## 2025-04-14 NOTE — TELEPHONE ENCOUNTER
Provider Staff:  Action required for this patient    Requires labs      Please see care gap opportunities below in Care Due Message.    Thanks!  [facility]     Appointments      Date Provider   Last Visit   1/17/2025 Sb Walls MD   Next Visit   4/17/2025 Sb Walls MD     Refill Decision Note   Ghulam Orellanancer  is requesting a refill authorization.  Brief Assessment and Rationale for Refill:  Approve     Medication Therapy Plan:         Comments:     Note composed:8:07 AM 04/14/2025

## 2025-04-14 NOTE — TELEPHONE ENCOUNTER
Care Due:                  Date            Visit Type   Department     Provider  --------------------------------------------------------------------------------                                EP -                              PRIMARY      [facility] FAMILY  Last Visit: 01-      CARE ([facility])   GARRISON Walls                              EP -                              PRIMARY      [facility] FAMILY  Next Visit: 04-      CARE ([facility])   [facility]       Sb Walls                                                            Last  Test          Frequency    Reason                     Performed    Due Date  --------------------------------------------------------------------------------    CBC.........  12 months..  allopurinoL..............  Not Found    Overdue    [employer] Embedded Care Due Messages. Reference number: [id number].   4/14/2025 3:36:31 AM CDT

## 2025-04-17 ENCOUNTER — OFFICE VISIT (OUTPATIENT)
Dept: FAMILY MEDICINE | Facility: CLINIC | Age: 70
End: 2025-04-17
Payer: MEDICARE

## 2025-04-17 ENCOUNTER — TELEPHONE (OUTPATIENT)
Dept: FAMILY MEDICINE | Facility: CLINIC | Age: 70
End: 2025-04-17

## 2025-04-17 VITALS
HEIGHT: 72 IN | SYSTOLIC BLOOD PRESSURE: 138 MMHG | OXYGEN SATURATION: 98 % | BODY MASS INDEX: 32.83 KG/M2 | DIASTOLIC BLOOD PRESSURE: 80 MMHG | WEIGHT: 242.38 LBS

## 2025-04-17 DIAGNOSIS — R73.9 ELEVATED BLOOD SUGAR: ICD-10-CM

## 2025-04-17 DIAGNOSIS — N18.31 CHRONIC KIDNEY DISEASE (CKD) STAGE G3A/A1, MODERATELY DECREASED GLOMERULAR FILTRATION RATE (GFR) BETWEEN 45-59 ML/MIN/1.73 SQUARE METER AND ALBUMINURIA CREATININE RATIO LESS THAN 30 MG/G: ICD-10-CM

## 2025-04-17 DIAGNOSIS — M1A.0720 CHRONIC IDIOPATHIC GOUT INVOLVING TOE OF LEFT FOOT WITHOUT TOPHUS: ICD-10-CM

## 2025-04-17 DIAGNOSIS — I10 ESSENTIAL HYPERTENSION, BENIGN: Primary | ICD-10-CM

## 2025-04-17 DIAGNOSIS — Z00.00 PREVENTATIVE HEALTH CARE: ICD-10-CM

## 2025-04-17 DIAGNOSIS — N18.1 CHRONIC KIDNEY DISEASE, STAGE I: ICD-10-CM

## 2025-04-17 DIAGNOSIS — E78.2 MIXED HYPERLIPIDEMIA: ICD-10-CM

## 2025-04-17 DIAGNOSIS — N40.0 BENIGN PROSTATIC HYPERPLASIA, UNSPECIFIED WHETHER LOWER URINARY TRACT SYMPTOMS PRESENT: ICD-10-CM

## 2025-04-17 NOTE — PROGRESS NOTES
Subjective:       Patient ID: Ghulam Rodriguez is a 69 y.o. male.    Chief Complaint: Health Maintenance (Pt had Labs and came in today for results review)      History of Present Illness              Review of Systems   Constitutional:  Negative for activity change, appetite change and fever.   Respiratory: Negative.     Cardiovascular: Negative.    Gastrointestinal: Negative.    Musculoskeletal: Negative.          Objective:      Physical Exam  Vitals and nursing note reviewed.   Constitutional:       Appearance: Normal appearance. He is obese.   Cardiovascular:      Rate and Rhythm: Normal rate and regular rhythm.      Pulses: Normal pulses.      Heart sounds: Normal heart sounds. No murmur heard.     No friction rub. No gallop.   Pulmonary:      Effort: Pulmonary effort is normal.      Breath sounds: Normal breath sounds. No wheezing.   Skin:     General: Skin is warm and dry.   Neurological:      Mental Status: He is alert.   Psychiatric:         Mood and Affect: Mood normal.         Assessment:       1. Essential hypertension, benign  Overview:  His blood pressure is mildly elevated today  He was started on nifedipine  Now on lisinopril too  Monitor    Orders:  -     Comprehensive Metabolic Panel; Future; Expected date: 04/17/2025  -     Microalbumin/Creatinine Ratio, Urine; Future; Expected date: 04/17/2025    2. Mixed hyperlipidemia  -     Lipid Panel; Future; Expected date: 04/17/2025    3. Benign prostatic hyperplasia, unspecified whether lower urinary tract symptoms present  -     Prostate Specific Antigen, Diagnostic; Future; Expected date: 04/17/2025    4. Elevated blood sugar  -     Hemoglobin A1C; Future; Expected date: 04/17/2025    5. Chronic idiopathic gout involving toe of left foot without tophus  Overview:  Stable  Inc uric acid    Assessment & Plan:  Cont alloprurinol  Diet modif  Monitor      6. Chronic kidney disease, stage I    7. Chronic kidney disease (CKD) stage G3a/A1, moderately decreased  glomerular filtration rate (GFR) between 45-59 mL/min/1.73 square meter and albuminuria creatinine ratio less than 30 mg/g  Overview:  Table, low GFR    Assessment & Plan:  I discussed renal condition with patient at length  We discussed diet modification specifically decreasing salt intake, avoiding fast food ,avoiding fried food  We also discussed the importance of minimizing the use of anti-inflammatory agents like Motrin ibuprofen naproxen Aleve etc.  We discussed the importance of increasing fluid intake specifically water and also stay hydrated  We discussed minimizing the use of diuretics as much as clinically possible  We are going to monitor renal functions BUN creatinine urine microalbumin and electrolytes closely  To consider referral to Nephrology Service if renal fx continue to decline  To consider adding an SGL- 2 inhibitor if renal functions continue to decline, regardless of the blood sugar status        8. Preventative health care  Overview:  See below    Assessment & Plan:  Get RSV , new COVID shots             Plan:       Assessment & Plan            Ghulam was seen today for health maintenance.    Diagnoses and all orders for this visit:    Essential hypertension, benign  -     Comprehensive Metabolic Panel; Future  -     Microalbumin/Creatinine Ratio, Urine; Future    Mixed hyperlipidemia  -     Lipid Panel; Future    Benign prostatic hyperplasia, unspecified whether lower urinary tract symptoms present  -     Prostate Specific Antigen, Diagnostic; Future    Elevated blood sugar  -     Hemoglobin A1C; Future    Chronic idiopathic gout involving toe of left foot without tophus    Chronic kidney disease, stage I    Chronic kidney disease (CKD) stage G3a/A1, moderately decreased glomerular filtration rate (GFR) between 45-59 mL/min/1.73 square meter and albuminuria creatinine ratio less than 30 mg/g    Preventative health care          Visit today included increased complexity associated with the  care of the episodic problem.   I addressed and managing the longitudinal care of the patient due to the serious and/or complex managed problem(s).  .

## 2025-06-23 ENCOUNTER — TELEPHONE (OUTPATIENT)
Dept: FAMILY MEDICINE | Facility: CLINIC | Age: 70
End: 2025-06-23
Payer: MEDICARE

## 2025-06-23 ENCOUNTER — PATIENT MESSAGE (OUTPATIENT)
Dept: FAMILY MEDICINE | Facility: CLINIC | Age: 70
End: 2025-06-23
Payer: MEDICARE

## 2025-06-23 NOTE — TELEPHONE ENCOUNTER
Called to reschedule patient's upcoming appointment, as Dr. Montoya is no longer with our practice. There was no answer, so a voicemail was left requesting a call back to reschedule with another provider.

## 2025-07-09 ENCOUNTER — PATIENT MESSAGE (OUTPATIENT)
Dept: FAMILY MEDICINE | Facility: CLINIC | Age: 70
End: 2025-07-09
Payer: MEDICARE

## 2025-07-17 PROCEDURE — 83036 HEMOGLOBIN GLYCOSYLATED A1C: CPT | Performed by: INTERNAL MEDICINE

## 2025-07-17 PROCEDURE — 82043 UR ALBUMIN QUANTITATIVE: CPT | Performed by: INTERNAL MEDICINE

## 2025-07-17 PROCEDURE — 82465 ASSAY BLD/SERUM CHOLESTEROL: CPT | Performed by: INTERNAL MEDICINE

## 2025-07-17 PROCEDURE — 84460 ALANINE AMINO (ALT) (SGPT): CPT | Performed by: INTERNAL MEDICINE

## 2025-07-18 ENCOUNTER — TELEPHONE (OUTPATIENT)
Dept: FAMILY MEDICINE | Facility: CLINIC | Age: 70
End: 2025-07-18
Payer: MEDICARE

## 2025-07-18 NOTE — TELEPHONE ENCOUNTER
----- Message from Clif Newell MD sent at 7/18/2025  6:48 AM CDT -----  Please contact the patient and let them know that their results show increasing blood sugar and some kidney dysfunction.  Needs office followup.  ----- Message -----  From: Lab, Background User  Sent: 7/17/2025   2:18 PM CDT  To: Results Cecilio Basurto

## 2025-07-18 NOTE — TELEPHONE ENCOUNTER
LVMTCB concerning labs. I also sent portal message to inform pt  that appt has been scheduled to discuss labs.                                                            -

## 2025-07-23 ENCOUNTER — OFFICE VISIT (OUTPATIENT)
Dept: FAMILY MEDICINE | Facility: CLINIC | Age: 70
End: 2025-07-23
Payer: MEDICARE

## 2025-07-23 VITALS
SYSTOLIC BLOOD PRESSURE: 138 MMHG | OXYGEN SATURATION: 99 % | BODY MASS INDEX: 32.98 KG/M2 | WEIGHT: 243.5 LBS | DIASTOLIC BLOOD PRESSURE: 82 MMHG | HEART RATE: 52 BPM | HEIGHT: 72 IN

## 2025-07-23 DIAGNOSIS — M10.00 IDIOPATHIC GOUT, UNSPECIFIED CHRONICITY, UNSPECIFIED SITE: ICD-10-CM

## 2025-07-23 DIAGNOSIS — R73.01 IMPAIRED FASTING GLUCOSE: ICD-10-CM

## 2025-07-23 DIAGNOSIS — I10 ESSENTIAL HYPERTENSION, BENIGN: Primary | ICD-10-CM

## 2025-07-23 DIAGNOSIS — N18.31 CHRONIC KIDNEY DISEASE (CKD) STAGE G3A/A1, MODERATELY DECREASED GLOMERULAR FILTRATION RATE (GFR) BETWEEN 45-59 ML/MIN/1.73 SQUARE METER AND ALBUMINURIA CREATININE RATIO LESS THAN 30 MG/G: ICD-10-CM

## 2025-07-23 PROBLEM — N18.1 CHRONIC KIDNEY DISEASE, STAGE I: Status: RESOLVED | Noted: 2025-04-17 | Resolved: 2025-07-23

## 2025-07-23 PROBLEM — Z00.00 PREVENTATIVE HEALTH CARE: Status: RESOLVED | Noted: 2024-10-01 | Resolved: 2025-07-23

## 2025-07-23 PROBLEM — M1A.0790 CHRONIC GOUT OF FOOT: Status: RESOLVED | Noted: 2025-01-17 | Resolved: 2025-07-23

## 2025-07-23 PROBLEM — M1A.0720 CHRONIC IDIOPATHIC GOUT INVOLVING TOE OF LEFT FOOT WITHOUT TOPHUS: Status: RESOLVED | Noted: 2025-04-17 | Resolved: 2025-07-23

## 2025-07-23 PROCEDURE — 99214 OFFICE O/P EST MOD 30 MIN: CPT | Mod: S$GLB,,, | Performed by: FAMILY MEDICINE

## 2025-07-23 PROCEDURE — 3079F DIAST BP 80-89 MM HG: CPT | Mod: CPTII,S$GLB,, | Performed by: FAMILY MEDICINE

## 2025-07-23 PROCEDURE — 1159F MED LIST DOCD IN RCRD: CPT | Mod: CPTII,S$GLB,, | Performed by: FAMILY MEDICINE

## 2025-07-23 PROCEDURE — 3288F FALL RISK ASSESSMENT DOCD: CPT | Mod: CPTII,S$GLB,, | Performed by: FAMILY MEDICINE

## 2025-07-23 PROCEDURE — 4010F ACE/ARB THERAPY RXD/TAKEN: CPT | Mod: CPTII,S$GLB,, | Performed by: FAMILY MEDICINE

## 2025-07-23 PROCEDURE — G2211 COMPLEX E/M VISIT ADD ON: HCPCS | Mod: S$GLB,,, | Performed by: FAMILY MEDICINE

## 2025-07-23 PROCEDURE — 1101F PT FALLS ASSESS-DOCD LE1/YR: CPT | Mod: CPTII,S$GLB,, | Performed by: FAMILY MEDICINE

## 2025-07-23 PROCEDURE — 3061F NEG MICROALBUMINURIA REV: CPT | Mod: CPTII,S$GLB,, | Performed by: FAMILY MEDICINE

## 2025-07-23 PROCEDURE — 1126F AMNT PAIN NOTED NONE PRSNT: CPT | Mod: CPTII,S$GLB,, | Performed by: FAMILY MEDICINE

## 2025-07-23 PROCEDURE — 3066F NEPHROPATHY DOC TX: CPT | Mod: CPTII,S$GLB,, | Performed by: FAMILY MEDICINE

## 2025-07-23 PROCEDURE — 3075F SYST BP GE 130 - 139MM HG: CPT | Mod: CPTII,S$GLB,, | Performed by: FAMILY MEDICINE

## 2025-07-23 PROCEDURE — 3044F HG A1C LEVEL LT 7.0%: CPT | Mod: CPTII,S$GLB,, | Performed by: FAMILY MEDICINE

## 2025-07-23 PROCEDURE — 3008F BODY MASS INDEX DOCD: CPT | Mod: CPTII,S$GLB,, | Performed by: FAMILY MEDICINE

## 2025-07-23 RX ORDER — NYSTATIN 100000 [USP'U]/G
POWDER TOPICAL 3 TIMES DAILY
Qty: 60 G | Refills: 3 | Status: SHIPPED | OUTPATIENT
Start: 2025-07-23

## 2025-07-23 NOTE — PROGRESS NOTES
[facility] - [place], [place]    Family Medicine Office Visit    Chief Complaint   Patient presents with    Follow-up        HPI:  followup    Blood Pressure at goal on medication, with patient reporting prescription compliance  Elevated cholesterol, he stopped his statin given aversion to medication  Uric acid elevated    Sugar has consistently been elevating over past year    ROS: as above    Vitals:    07/23/25 0959   BP: 138/82   BP Location: Left arm   Patient Position: Sitting   Pulse: (!) 52   SpO2: 99%   Weight: 110.5 kg (243 lb 8 oz)   Height: 6' (1.829 m)      Body mass index is 33.02 kg/m².      General:  AOx3, well nourished and developed in no acute distress  Eyes:  PERRLA, EOMI, vision intact grossly  ENT:  normal hearing, moist oral mucosa  Neck:  trachea midline with no masses or thyromegaly  Heart:  RRR, no murmurs.  No edema noted, extremities warm and well perfused  Lungs:  clear to auscultation bilaterally with symmetric chest movement  Abdomen:  Soft, nontender, nondistended.  Normal bowel sounds  Musculoskeletal:  Normal gait.  Normal posture.  Normal muscular development with no joint swelling.  Neurological:  CN II-XII grossly intact. Symmetric strength and sensation  Psych:  Normal mood and affect.  Able to demonstrate good judgement and personal insight.      Assessment/Plan:    1. Essential hypertension, benign    2. Chronic kidney disease (CKD) stage G3a/A1, moderately decreased glomerular filtration rate (GFR) between 45-59 mL/min/1.73 square meter and albuminuria creatinine ratio less than 30 mg/g    3. Impaired fasting glucose    4. Idiopathic gout, unspecified chronicity, unspecified site  Overview:  With occasional pain in his right great toe         At goal  Likely related to sugar metabolism.  Increase water intake  Drinks about 6 cokes daily.  Absolutely needs to stop this.  Needs  reduction in sugar    Visit today included increased complexity  associated with the care of the episodic problem htn, ckd, pre diabetes addressed and managing the longitudinal care of the patient due to the serious and/or complex managed problem(s) htn, ckd, pre diabetes.

## 2025-07-23 NOTE — PATIENT INSTRUCTIONS
Start working to cut soda out of diet - we will repeat labs in 3 months  Use powder to groin    Follow up 3 months